# Patient Record
Sex: FEMALE | Race: BLACK OR AFRICAN AMERICAN | HISPANIC OR LATINO | Employment: UNEMPLOYED | URBAN - METROPOLITAN AREA
[De-identification: names, ages, dates, MRNs, and addresses within clinical notes are randomized per-mention and may not be internally consistent; named-entity substitution may affect disease eponyms.]

---

## 2020-12-01 ENCOUNTER — HOSPITAL ENCOUNTER (EMERGENCY)
Facility: HOSPITAL | Age: 35
Discharge: HOME | End: 2020-12-01
Attending: EMERGENCY MEDICINE
Payer: MEDICAID

## 2020-12-01 VITALS
SYSTOLIC BLOOD PRESSURE: 113 MMHG | HEART RATE: 95 BPM | TEMPERATURE: 98 F | WEIGHT: 213 LBS | DIASTOLIC BLOOD PRESSURE: 60 MMHG | RESPIRATION RATE: 20 BRPM | BODY MASS INDEX: 35.49 KG/M2 | HEIGHT: 65 IN | OXYGEN SATURATION: 98 %

## 2020-12-01 DIAGNOSIS — T78.02XA ANAPHYLAXIS DUE TO CRUSTACEANS, INITIAL ENCOUNTER: Primary | ICD-10-CM

## 2020-12-01 DIAGNOSIS — R06.02 SOB (SHORTNESS OF BREATH): ICD-10-CM

## 2020-12-01 DIAGNOSIS — R09.89 THROAT TIGHTNESS: ICD-10-CM

## 2020-12-01 DIAGNOSIS — R06.2 WHEEZING: ICD-10-CM

## 2020-12-01 PROCEDURE — 25000003 PHARM REV CODE 250: Performed by: EMERGENCY MEDICINE

## 2020-12-01 PROCEDURE — 96375 TX/PRO/DX INJ NEW DRUG ADDON: CPT

## 2020-12-01 PROCEDURE — 99284 EMERGENCY DEPT VISIT MOD MDM: CPT | Mod: 25

## 2020-12-01 PROCEDURE — 63600175 PHARM REV CODE 636 W HCPCS: Performed by: EMERGENCY MEDICINE

## 2020-12-01 PROCEDURE — 96372 THER/PROPH/DIAG INJ SC/IM: CPT

## 2020-12-01 PROCEDURE — 96374 THER/PROPH/DIAG INJ IV PUSH: CPT

## 2020-12-01 RX ORDER — METHYLPREDNISOLONE SOD SUCC 125 MG
125 VIAL (EA) INJECTION
Status: COMPLETED | OUTPATIENT
Start: 2020-12-01 | End: 2020-12-01

## 2020-12-01 RX ORDER — METHYLPREDNISOLONE SOD SUCC 125 MG
125 VIAL (EA) INJECTION
Status: DISCONTINUED | OUTPATIENT
Start: 2020-12-01 | End: 2020-12-01

## 2020-12-01 RX ORDER — DIPHENHYDRAMINE HYDROCHLORIDE 50 MG/ML
50 INJECTION INTRAMUSCULAR; INTRAVENOUS
Status: DISCONTINUED | OUTPATIENT
Start: 2020-12-01 | End: 2020-12-01

## 2020-12-01 RX ORDER — EPINEPHRINE 0.3 MG/.3ML
0.3 INJECTION SUBCUTANEOUS ONCE
Status: COMPLETED | OUTPATIENT
Start: 2020-12-01 | End: 2020-12-01

## 2020-12-01 RX ORDER — PREDNISONE 50 MG/1
50 TABLET ORAL DAILY
Qty: 3 TABLET | Refills: 0 | Status: SHIPPED | OUTPATIENT
Start: 2020-12-01 | End: 2020-12-04

## 2020-12-01 RX ORDER — FAMOTIDINE 20 MG/1
20 TABLET, FILM COATED ORAL
Status: COMPLETED | OUTPATIENT
Start: 2020-12-01 | End: 2020-12-01

## 2020-12-01 RX ORDER — DIPHENHYDRAMINE HYDROCHLORIDE 50 MG/ML
50 INJECTION INTRAMUSCULAR; INTRAVENOUS
Status: COMPLETED | OUTPATIENT
Start: 2020-12-01 | End: 2020-12-01

## 2020-12-01 RX ORDER — EPINEPHRINE 0.3 MG/.3ML
0.3 INJECTION SUBCUTANEOUS ONCE
Status: DISCONTINUED | OUTPATIENT
Start: 2020-12-01 | End: 2020-12-01

## 2020-12-01 RX ADMIN — FAMOTIDINE 20 MG: 20 TABLET ORAL at 04:12

## 2020-12-01 RX ADMIN — DIPHENHYDRAMINE HYDROCHLORIDE 50 MG: 50 INJECTION INTRAMUSCULAR; INTRAVENOUS at 04:12

## 2020-12-01 RX ADMIN — METHYLPREDNISOLONE SODIUM SUCCINATE 125 MG: 125 INJECTION, POWDER, FOR SOLUTION INTRAMUSCULAR; INTRAVENOUS at 04:12

## 2020-12-01 RX ADMIN — EPINEPHRINE 0.3 MG: 0.3 INJECTION INTRAMUSCULAR at 04:12

## 2020-12-01 NOTE — ED NOTES
Pt states itching is subsiding and SOB has greatly improved. Pt on continuous pulse ox, cardiac monitor and nibp cuff.

## 2020-12-01 NOTE — ED NOTES
Pt reports she continues to feel much better. Denies SOB and itching. Denies any needs at this time. Pt able to speak in full clear sentences without difficulty.

## 2020-12-01 NOTE — ED PROVIDER NOTES
Encounter Date: 12/1/2020       History     Chief Complaint   Patient presents with    Allergic Reaction     Patient presents to the ED with reports of having shortness of breath and wheezing after being exposed by family who was cooking shrimp pasta. Patient states she is allergic to shrimp and did not eat it. Patient states having used her inhaler with no relief. +Wheezing noted, but patient is speaking in full sentences.        35-year-old female with history of severe allergy to shellfish presents the ER for evaluation of allergic reaction.  Was exposed to selfish a today, held it, since then has been having shortness of breath and wheezing.  Came to the ER, tachycardic diffuse wheezing noted exam.  Patient was immediately taken to room for further evaluation.  Was in normal state health prior to this.        Review of patient's allergies indicates:  No Known Allergies  No past medical history on file.  No past surgical history on file.  No family history on file.  Social History     Tobacco Use    Smoking status: Not on file   Substance Use Topics    Alcohol use: Not on file    Drug use: Not on file     Review of Systems   Respiratory: Positive for shortness of breath and wheezing. Negative for chest tightness.    Cardiovascular: Negative for chest pain.   Gastrointestinal: Negative for abdominal pain.   All other systems reviewed and are negative.      Physical Exam     Initial Vitals [12/01/20 0354]   BP Pulse Resp Temp SpO2   116/76 (!) 118 (!) 22 97.9 °F (36.6 °C) (!) 94 %      MAP       --         Physical Exam    Constitutional: She appears well-developed and well-nourished. She appears distressed.   Cardiovascular: Normal rate, regular rhythm and normal heart sounds.   Pulmonary/Chest:   Tachypneic diffuse wheezing   Abdominal: Soft. She exhibits no distension. There is no abdominal tenderness.   Musculoskeletal: Normal range of motion.   Neurological: She is oriented to person, place, and time. She  has normal strength. GCS score is 15. GCS eye subscore is 4. GCS verbal subscore is 5. GCS motor subscore is 6.   Skin: Skin is warm and dry. Capillary refill takes less than 2 seconds.   Psychiatric: She has a normal mood and affect. Thought content normal.         ED Course   Procedures  Labs Reviewed - No data to display       Imaging Results    None          Medical Decision Making:   Initial Assessment:     35-year-old female who presents to the ER for evaluation of exposure to the seafood.  Patient reports she has a severe seafood allergy, mother was cooking shrimp, she inhaled it, then started having shortness of breath and wheezing.  Came to the ER for further evaluation.  Increased respiratory effort noted with bilateral wheezing.  Patient reports she is 17 weeks pregnant.   Concern for anaphylaxis.  Will give epinephrine Solu-Medrol Benadryl Pepcid reassess.    ED Management:  Upon reassessment, vitals are stable, no complaints, no sign of rebound anaphylaxis.  Patient will be discharged with steroids, return precautions, advised to take EpiPen next time this happens and to come to the ER.  Patient will be discharged.                   ED Course as of Dec 01 0612   Tue Dec 01, 2020   0456  Resting in bed no acute distress.  Patient symptoms have completely resolved.  Will continue to observe for couple hours likely discharge.  Patient versus she does have an EpiPen for this, is scared given her pregnancy.  Informed patient EpiPen is safe in pregnancy.    [SE]   0508  Fetal ultrasound performed by myself, spontaneous fetal movement, heart rate 140-160s, appropriate amniotic fluid    [SE]      ED Course User Index  [SE] Blanca Ackerman MD            Clinical Impression:     ICD-10-CM ICD-9-CM   1. Anaphylactic shock due to crustaceans, initial encounter  T78.02XA 995.62   2. SOB (shortness of breath)  R06.02 786.05   3. Wheezing  R06.2 786.07                      Disposition:   Disposition:  Discharged  Condition: Stable     ED Disposition Condition    Discharge Stable        ED Prescriptions     Medication Sig Dispense Start Date End Date Auth. Provider    predniSONE (DELTASONE) 50 MG Tab Take 1 tablet (50 mg total) by mouth once daily. for 3 days 3 tablet 12/1/2020 12/4/2020 Blanca Ackerman MD        Follow-up Information    None                                      Blanca Ackerman MD  12/01/20 0661

## 2020-12-01 NOTE — ED NOTES
Pt presents to the ED secondary to an allergic reaction. Pt reports she is allergic to shellfish and someone in was cooking shrimp in her home. Denies ingestion but states she began to feel SOB. Pt states she has an epipen for emergency use but states she did not use it because she is 17 weeks pregnant.